# Patient Record
Sex: MALE | Race: ASIAN | NOT HISPANIC OR LATINO | Employment: UNEMPLOYED | URBAN - METROPOLITAN AREA
[De-identification: names, ages, dates, MRNs, and addresses within clinical notes are randomized per-mention and may not be internally consistent; named-entity substitution may affect disease eponyms.]

---

## 2024-08-13 ENCOUNTER — CONSULT (OUTPATIENT)
Dept: ONCOLOGY | Facility: CLINIC | Age: 39
End: 2024-08-13
Payer: COMMERCIAL

## 2024-08-13 ENCOUNTER — LAB (OUTPATIENT)
Dept: LAB | Facility: HOSPITAL | Age: 39
End: 2024-08-13
Payer: COMMERCIAL

## 2024-08-13 VITALS
DIASTOLIC BLOOD PRESSURE: 81 MMHG | TEMPERATURE: 97 F | HEART RATE: 105 BPM | HEIGHT: 64 IN | OXYGEN SATURATION: 98 % | BODY MASS INDEX: 22.06 KG/M2 | WEIGHT: 129.2 LBS | SYSTOLIC BLOOD PRESSURE: 140 MMHG

## 2024-08-13 DIAGNOSIS — D75.839 THROMBOCYTOSIS: Primary | ICD-10-CM

## 2024-08-13 DIAGNOSIS — D75.839 THROMBOCYTOSIS: ICD-10-CM

## 2024-08-13 LAB
BASOPHILS # BLD AUTO: 0.04 10*3/MM3 (ref 0–0.2)
BASOPHILS NFR BLD AUTO: 0.6 % (ref 0–1.5)
DEPRECATED RDW RBC AUTO: 40.6 FL (ref 37–54)
EOSINOPHIL # BLD AUTO: 0.22 10*3/MM3 (ref 0–0.4)
EOSINOPHIL NFR BLD AUTO: 3.4 % (ref 0.3–6.2)
ERYTHROCYTE [DISTWIDTH] IN BLOOD BY AUTOMATED COUNT: 12.4 % (ref 12.3–15.4)
HCT VFR BLD AUTO: 47.3 % (ref 37.5–51)
HGB BLD-MCNC: 16.3 G/DL (ref 13–17.7)
IMM GRANULOCYTES # BLD AUTO: 0.02 10*3/MM3 (ref 0–0.05)
IMM GRANULOCYTES NFR BLD AUTO: 0.3 % (ref 0–0.5)
LYMPHOCYTES # BLD AUTO: 2.03 10*3/MM3 (ref 0.7–3.1)
LYMPHOCYTES NFR BLD AUTO: 31.8 % (ref 19.6–45.3)
MCH RBC QN AUTO: 30.7 PG (ref 26.6–33)
MCHC RBC AUTO-ENTMCNC: 34.5 G/DL (ref 31.5–35.7)
MCV RBC AUTO: 89.1 FL (ref 79–97)
MONOCYTES # BLD AUTO: 0.71 10*3/MM3 (ref 0.1–0.9)
MONOCYTES NFR BLD AUTO: 11.1 % (ref 5–12)
NEUTROPHILS NFR BLD AUTO: 3.37 10*3/MM3 (ref 1.7–7)
NEUTROPHILS NFR BLD AUTO: 52.8 % (ref 42.7–76)
PLATELET # BLD AUTO: 383 10*3/MM3 (ref 140–450)
PMV BLD AUTO: 10 FL (ref 6–12)
RBC # BLD AUTO: 5.31 10*6/MM3 (ref 4.14–5.8)
WBC NRBC COR # BLD AUTO: 6.39 10*3/MM3 (ref 3.4–10.8)

## 2024-08-13 PROCEDURE — 99204 OFFICE O/P NEW MOD 45 MIN: CPT | Performed by: INTERNAL MEDICINE

## 2024-08-13 PROCEDURE — 85025 COMPLETE CBC W/AUTO DIFF WBC: CPT

## 2024-08-13 PROCEDURE — 36415 COLL VENOUS BLD VENIPUNCTURE: CPT

## 2024-08-13 NOTE — PROGRESS NOTES
New Patient Office Visit      Date: 2024     Patient Name: Lucius Rosenthal  MRN: [id number]  : 1985  Referring Physician: Dianna Corrales    Chief Complaint: Establish care for thrombocytosis    History of Present Illness: Lucius Rosenthal is a pleasant 39 y.o. male with no significant past medical history who presents today for evaluation of thrombocytosis. The patient has been followed with his PCP is monitoring CBCs which has been notable for mild thrombocytosis ranging between 372K-430K.  He notes a family history of his father who passed from a stroke but denies any other family or personal history of blood clots.  Denies any chest pain or discomfort.  Iron studies were checked and were negative.  Denies any family history of a myeloproliferative process    Oncology History:    Oncology/Hematology History    No history exists.       Subjective      Review of Systems:     Constitutional: Negative for fevers, chills, or weight loss  Eyes: Negative for blurred vision or discharge         Ear/Nose/Throat: Negative for difficulty swallowing, sore throat, LAD                                                       Respiratory: Negative for cough, SOA, wheezing                                                                                        Cardiovascular: Negative for chest pain or palpitations                                                                  Gastrointestinal: Negative for nausea, vomiting or diarrhea                                                                     Genitourinary: Negative for dysuria or hematuria                                                                                           Musculoskeletal: Negative for any joint pains or muscle aches                                                                        Neurologic: Negative for any weakness, headaches, dizziness                                                                         Hematologic: Negative  "for any easy bleeding or bruising                                                                                   Psychiatric: Negative for anxiety or depression                             Past Medical History: No past medical history on file.    Past Surgical History: No past surgical history on file.    Family History: No family history on file.    Social History:   Social History     Socioeconomic History    Marital status:        Medications:   No current outpatient medications on file.    Allergies:   No Known Allergies    Objective     Physical Exam:  Vital Signs:   Vitals:    08/13/24 1432   BP: 140/81   Pulse: 105   Temp: 97 °F (36.1 °C)   TempSrc: Infrared   SpO2: 98%   Weight: 58.6 kg (129 lb 3.2 oz)   Height: 162.5 cm (63.98\")   PainSc: 0-No pain     Pain Score    08/13/24 1432   PainSc: 0-No pain     ECOG Performance Status: 0 - Asymptomatic    Constitutional: NAD, ECOG 0  Eyes: PERRLA, scleral anicteric  ENT: No LAD, no thyromegaly  Respiratory: CTAB, no wheezing, rales, rhonchi  Cardiovascular: RRR, no murmurs, pulses 2+ bilaterally  Abdomen: soft, NT/ND, no HSM  Musculoskeletal: strength 5/5 bilaterally, no c/c/e  Neurologic: A&O x 3, CN II-XII intact grossly  Psych: mood and affect congruent, no SI or HI    Results Review:   No visits with results within 2 Week(s) from this visit.   Latest known visit with results is:   No results found for any previous visit.       No results found.    Assessment / Plan      Assessment/Plan:   1. Thrombocytosis (Primary)  -Platelet count range between 372K-430K over the past several years  -Low concern for myeloproliferative disorder as a normal platelet count is between 150K-450K  -Previously checked iron studies within normal limits  -Will repeat platelet count today  -Will check JAK2 mutational testing for completeness  -     CBC & Differential; Future  -     JAK2 V617F, reflex to CALR + MPL + E12-15; Future           Follow Up:   Follow-up as needed   "   Jose Olivia MD  Hematology and Oncology     Please note that portions of this note may have been completed with a voice recognition program. Efforts were made to edit the dictations, but occasionally words are mistranscribed.

## 2024-08-19 LAB
CALR EXON 9 MUT ANL BLD/T: NORMAL
CITATION REF LAB TEST: NORMAL
JAK2 GENE MUT ANL BLD/T: NORMAL
JAK2 P.V617F BLD/T QL: NORMAL
LAB DIRECTOR NAME PROVIDER: NORMAL
MPL GENE MUT TESTED MAR: NORMAL
REF LAB TEST METHOD: NORMAL
REFLEX: NORMAL
TEST PERFORMANCE INFO SPEC: NORMAL